# Patient Record
Sex: MALE | Race: WHITE | NOT HISPANIC OR LATINO | URBAN - NONMETROPOLITAN AREA
[De-identification: names, ages, dates, MRNs, and addresses within clinical notes are randomized per-mention and may not be internally consistent; named-entity substitution may affect disease eponyms.]

---

## 2017-06-28 PROBLEM — H52.03: Noted: 2017-06-28

## 2017-06-28 PROBLEM — E11.9: Noted: 2018-11-29

## 2020-03-11 ENCOUNTER — IMPORTED ENCOUNTER (OUTPATIENT)
Dept: URBAN - NONMETROPOLITAN AREA CLINIC 1 | Facility: CLINIC | Age: 65
End: 2020-03-11

## 2020-03-11 PROCEDURE — 92014 COMPRE OPH EXAM EST PT 1/>: CPT

## 2020-03-11 PROCEDURE — 92015 DETERMINE REFRACTIVE STATE: CPT

## 2020-03-11 NOTE — PATIENT DISCUSSION
Hyperopia/Astigmatism/Presbyopia OUDiscussed refractive status in detail with patient. New glasses Rx given today. IDDM sans retinopathy OUDiscussed diagnosis with patient. Discussed the risk of diabetic damage of the retina with potential vision loss and the importance of routine follow-up. Emphasized strict blood sugar control. Will send notes today. Continue to monitor.

## 2022-04-09 ASSESSMENT — VISUAL ACUITY
OS_CC: 20/20-1
OD_CC: 20/25+

## 2022-04-09 ASSESSMENT — TONOMETRY
OS_IOP_MMHG: 12
OD_IOP_MMHG: 12